# Patient Record
Sex: MALE
[De-identification: names, ages, dates, MRNs, and addresses within clinical notes are randomized per-mention and may not be internally consistent; named-entity substitution may affect disease eponyms.]

---

## 2018-05-14 ENCOUNTER — HOSPITAL ENCOUNTER (INPATIENT)
Dept: HOSPITAL 92 - ERS | Age: 53
LOS: 3 days | Discharge: HOME | DRG: 417 | End: 2018-05-17
Attending: FAMILY MEDICINE | Admitting: FAMILY MEDICINE
Payer: SELF-PAY

## 2018-05-14 VITALS — BODY MASS INDEX: 35 KG/M2

## 2018-05-14 DIAGNOSIS — K85.90: ICD-10-CM

## 2018-05-14 DIAGNOSIS — I10: ICD-10-CM

## 2018-05-14 DIAGNOSIS — K80.71: Primary | ICD-10-CM

## 2018-05-14 LAB
ALBUMIN SERPL BCG-MCNC: 4.3 G/DL (ref 3.5–5)
ALP SERPL-CCNC: 278 U/L (ref 40–150)
ALT SERPL W P-5'-P-CCNC: 368 U/L (ref 8–55)
ANION GAP SERPL CALC-SCNC: 13 MMOL/L (ref 10–20)
AST SERPL-CCNC: 131 U/L (ref 5–34)
BASOPHILS # BLD AUTO: 0 THOU/UL (ref 0–0.2)
BASOPHILS NFR BLD AUTO: 0.5 % (ref 0–1)
BILIRUB DIRECT SERPL-MCNC: 3.9 MG/DL (ref 0.1–0.3)
BILIRUB SERPL-MCNC: 5.4 MG/DL (ref 0.2–1.2)
BUN SERPL-MCNC: 20 MG/DL (ref 8.9–20.6)
CALCIUM SERPL-MCNC: 9.8 MG/DL (ref 7.8–10.44)
CHLORIDE SERPL-SCNC: 105 MMOL/L (ref 98–107)
CO2 SERPL-SCNC: 23 MMOL/L (ref 22–29)
CREAT CL PREDICTED SERPL C-G-VRATE: 0 ML/MIN (ref 70–130)
EOSINOPHIL # BLD AUTO: 0 THOU/UL (ref 0–0.7)
EOSINOPHIL NFR BLD AUTO: 0.1 % (ref 0–10)
GLOBULIN SER CALC-MCNC: 3.7 G/DL (ref 2.4–3.5)
GLUCOSE SERPL-MCNC: 161 MG/DL (ref 70–105)
HBCM INDEX: 0.08 S/CO (ref 0–0.79)
HBSAG INDEX: 0.17 S/CO (ref 0–0.99)
HEP A IGM S/CO: 0.14 S/CO (ref 0–0.79)
HEP C INDEX: 0.09 S/CO (ref 0–0.79)
HGB BLD-MCNC: 15.1 G/DL (ref 14–18)
LDH1 SERPL-CCNC: 282 U/L (ref 125–220)
LIPASE SERPL-CCNC: 7593 U/L (ref 8–78)
LYMPHOCYTES # BLD: 0.7 THOU/UL (ref 1.2–3.4)
LYMPHOCYTES NFR BLD AUTO: 10.3 % (ref 21–51)
MAGNESIUM SERPL-MCNC: 2 MG/DL (ref 1.6–2.6)
MCH RBC QN AUTO: 31.4 PG (ref 27–31)
MCV RBC AUTO: 92.4 FL (ref 80–94)
MONOCYTES # BLD AUTO: 0.1 THOU/UL (ref 0.11–0.59)
MONOCYTES NFR BLD AUTO: 1.8 % (ref 0–10)
NEUTROPHILS # BLD AUTO: 6.3 THOU/UL (ref 1.4–6.5)
NEUTROPHILS NFR BLD AUTO: 87.3 % (ref 42–75)
PLATELET # BLD AUTO: 218 THOU/UL (ref 130–400)
POTASSIUM SERPL-SCNC: 4.4 MMOL/L (ref 3.5–5.1)
RBC # BLD AUTO: 4.81 MILL/UL (ref 4.7–6.1)
SODIUM SERPL-SCNC: 137 MMOL/L (ref 136–145)
SP GR UR STRIP: 1.04 (ref 1–1.04)
WBC # BLD AUTO: 7.2 THOU/UL (ref 4.8–10.8)

## 2018-05-14 PROCEDURE — 83690 ASSAY OF LIPASE: CPT

## 2018-05-14 PROCEDURE — 85025 COMPLETE CBC W/AUTO DIFF WBC: CPT

## 2018-05-14 PROCEDURE — 83615 LACTATE (LD) (LDH) ENZYME: CPT

## 2018-05-14 PROCEDURE — 74330 X-RAY BILE/PANC ENDOSCOPY: CPT

## 2018-05-14 PROCEDURE — 36415 COLL VENOUS BLD VENIPUNCTURE: CPT

## 2018-05-14 PROCEDURE — 71046 X-RAY EXAM CHEST 2 VIEWS: CPT

## 2018-05-14 PROCEDURE — 76705 ECHO EXAM OF ABDOMEN: CPT

## 2018-05-14 PROCEDURE — 96365 THER/PROPH/DIAG IV INF INIT: CPT

## 2018-05-14 PROCEDURE — S0028 INJECTION, FAMOTIDINE, 20 MG: HCPCS

## 2018-05-14 PROCEDURE — 81003 URINALYSIS AUTO W/O SCOPE: CPT

## 2018-05-14 PROCEDURE — 74177 CT ABD & PELVIS W/CONTRAST: CPT

## 2018-05-14 PROCEDURE — 96361 HYDRATE IV INFUSION ADD-ON: CPT

## 2018-05-14 PROCEDURE — 80053 COMPREHEN METABOLIC PANEL: CPT

## 2018-05-14 PROCEDURE — 80074 ACUTE HEPATITIS PANEL: CPT

## 2018-05-14 PROCEDURE — 82977 ASSAY OF GGT: CPT

## 2018-05-14 PROCEDURE — 93005 ELECTROCARDIOGRAM TRACING: CPT

## 2018-05-14 PROCEDURE — 83735 ASSAY OF MAGNESIUM: CPT

## 2018-05-14 PROCEDURE — 96375 TX/PRO/DX INJ NEW DRUG ADDON: CPT

## 2018-05-14 PROCEDURE — 82248 BILIRUBIN DIRECT: CPT

## 2018-05-14 PROCEDURE — 80061 LIPID PANEL: CPT

## 2018-05-14 PROCEDURE — 86901 BLOOD TYPING SEROLOGIC RH(D): CPT

## 2018-05-14 PROCEDURE — 84100 ASSAY OF PHOSPHORUS: CPT

## 2018-05-14 PROCEDURE — 86301 IMMUNOASSAY TUMOR CA 19-9: CPT

## 2018-05-14 PROCEDURE — 86850 RBC ANTIBODY SCREEN: CPT

## 2018-05-14 PROCEDURE — 88304 TISSUE EXAM BY PATHOLOGIST: CPT

## 2018-05-14 PROCEDURE — 86900 BLOOD TYPING SEROLOGIC ABO: CPT

## 2018-05-14 PROCEDURE — 47532 INJECTION FOR CHOLANGIOGRAM: CPT

## 2018-05-14 NOTE — CT
CT ABDOMEN AND PELVIS WITH IV CONTRAST:

 

Date:  05/14/18 

 

HISTORY:  

Epigastric pain and jaundice. Vomiting. 

 

COMPARISON:  

None available. 

 

FINDINGS:

There is bibasilar atelectasis. 

 

There is intra and extrahepatic biliary ductal dilatation. The common duct measures 1.2 cm. There is 
dilatation of the common duct to the level of the duodenum with dilatation in the region of the ampul
la. Ampullary lesion is a possibility. 

 

The gallbladder is incompletely distended, but is lobulated in appearance and there is increased dens
ity material within the gallbladder lumen. This could be related to sludge and/or calculi, but gallbl
adder ultrasound is recommended. 

 

The spleen, pancreas, bilateral adrenal glands, kidneys, and urinary bladder demonstrate a normal CT 
appearance. 

 

There is colonic diverticulosis. There is mild nonspecific inflammatory stranding seen at the tail of
 the pancreas. Exact etiology for the minimal amount of fluid and peripancreatic stranding is uncerta
in. Pancreatitis cannot be entirely excluded, but there are no inflammatory changes seen adjacent to 
the remainder of the pancreas. 

 

There is mild prominence of loops of small bowel which are fluid-filled. 

 

Degenerative changes are noted in the spine. 

 

 

IMPRESSION: 

 

1.  Intra and extrahepatic biliary ductal dilatation with the common duct measuring 1.2 cm. There is 
dilatation of the common duct to the level of the ampulla. Ampullary lesion could not be excluded. 

 

2.  Increased density material in the gallbladder lumen, which could be related to sludge or gallblad
fitz calculi. The gallbladder is also lobulated and contracted. Further evaluation right upper quadran
t ultrasound is recommended. 

 

3.  Inflammatory stranding in the left paracolic gutter adjacent to the tail of the pancreas. Exact e
tiology for these inflammatory changes is uncertain. Focal pancreatitis could not be entirely exclude
d; there are no inflammatory changes seen adjacent to the remainder of the pancreas. 

 

4.  Colonic diverticulosis. 

 

5.  Fluid-filled and mildly prominent loops of small bowel. There are no findings to definitely sugge
st obstruction. 

 

 

 

POS: [facility]

## 2018-05-14 NOTE — PDOC.FPRHP
- History of Present Illness


Chief Complaint: Abdominal pain 


History of Present Illness: 





51 yo male presents with 1 day history of nausea, vomiting and abdominal pain. 

He states that it began last night around 7:00 pm and has progressively gotten 

worse. He states that he has not had any recent illness. He states he had one 

drink of liquor on Saturday. He denies having an excess of fatty foods and 

actually states he has been on a diet. He also states that he has not had SOB, 

chest pain, fevers, or chills. He has not had this happen before. He states the 

morphine and the zofran have been very helpful. No other complaints today.


ED Course: 





Morphine


Zofran


1 L NS


1 L LR





- Allergies/Adverse Reactions


 Allergies











Allergy/AdvReac Type Severity Reaction Status Date / Time


 


No Known Drug Allergies Allergy   Verified 18 15:08














- Home Medications


Comments: 





unable to obtain at this time, family will bring medications





- History


PMHx: Hypertension, Elevated Liver enzymes


 


PSHx: None





FHx: noncontributory


 


Social: drinks alcohol social, no drugs, no smoking





- Review of Systems


General: denies: fever/chills


Eyes: denies: eye pain


ENT: denies: nasal congestion, rhinorrhea


Respiratory: denies: cough, congestion, shortness of breath


Cardiovascular: denies: chest pain, palpitation, edema


Gastrointestinal: reports: nausea, vomiting, abdominal pain


Genitourinary: denies: incontinence, dysuria


Skin: reports: jaundice.  denies: rashes, lesions


Musculoskeletal: denies: pain, tenderness, stiffness


Neurological: denies: numbness, syncope


Psychological: denies: anxiety, depression





- Vital signs


BP: 155/98  HR: 71 RR: 16 Tmax:  Pox: 97% on RmAir  Wt: 86 kg   








- Physical Exam


Constitutional: NAD, awake, alert and oriented, well developed


HEENT: normocephalic and atraumatic, PERRLA, EOMI, conjunctiva clear, grossly 

normal vision, TM's clear and intact, grossly normal hearing, normal nasal 

mucosa, MMM, oropharynx clear


-HEENT: 





icteric sclera, sublingual jaundice


Neck: supple, FROM, trachea midline, no LAD


Chest: no-tender to palpation, no lesions


Heart: RRR, normal S1/S2, no murmurs/rubs/gallops


Lungs: CTAB, no respiratory distress, good air movement, no rales/rhonchi, no 

wheezing


Abdomen: soft, bowel sounds present, no masses/distention, no hernias


-Abdomen: 





RUQ and epigastric TTP, no rebound, no guarding


Musculoskeletal: normal structure, normal tone, ROM grossly normal


Neurological: no focal deficit, CN II-XII intact, normal sensation


Skin: no rash/lesions, good turgor, capillary refill <2 seconds


Heme/Lymphatic: no unusual bruising or bleeding, no purpura, no petechia


Psychiatric: normal mood and affect, good judgment and insight, intact recent 

and remote memory





FMR H&P: Results





- Labs


Result Diagrams: 


 18 12:05





 18 12:05


Lab results: 


 











WBC  7.2 thou/uL (4.8-10.8)   18  12:05    


 


Hgb  15.1 g/dL (14.0-18.0)   18  12:05    


 


Hct  44.5 % (42.0-52.0)   18  12:05    


 


MCV  92.4 fl (80.0-94.0)   18  12:05    


 


Plt Count  218 thou/uL (130-400)   18  12:05    


 


Neutrophils %  87.3 % (42.0-75.0)  H  18  12:05    


 


Sodium  137 mmol/L (136-145)   18  12:05    


 


Potassium  4.4 mmol/L (3.5-5.1)   18  12:05    


 


Chloride  105 mmol/L ()   18  12:05    


 


Carbon Dioxide  23 mmol/L (22-29)   18  12:05    


 


BUN  20 mg/dL (8.9-20.6)   18  12:05    


 


Creatinine  0.97 mg/dL (0.6-1.3)   18  12:05    


 


Glucose  161 mg/dL ()  H  18  12:05    


 


Calcium  9.8 mg/dL (7.8-10.44)   18  12:05    


 


Total Bilirubin  5.4 mg/dL (0.2-1.2)  H  18  12:05    


 


AST  131 U/L (5-34)  H  18  12:05    


 


ALT  368 U/L (8-55)  H  18  12:05    


 


Alkaline Phosphatase  278 U/L ()  H  18  12:05    


 


Serum Total Protein  8.0 g/dL (6.0-8.3)   18  12:05    


 


Albumin  4.3 g/dL (3.5-5.0)   18  12:05    


 


Lipase  7593 U/L (8-78)  H  18  12:05    














- EKG Interpretation


EK lead EKG shows normal sinus rhythm, Rate (beats per minute): 62, with no 

ectopics, Interpretation: normal EKG, No other findings, Clinical impression: 

Normal EKG.





- Radiology Interpretation


  ** CT scan - abdomen


Status: report reviewed by me (Intra and extrahepatic biliary ductal dilatation 

with the common duct measuring 1.2cm. There is dilatation of the common duct to 

the level of the ampulla. Ampullary lesion could not be excluded. Increased 

density material in the gallbladder lumen, which could. be related to sludge or 

gallbladder calculi. The gallbladder is also lobulated and contracted. Further 

evaluation right upper quadrant is recommended. Inflammatory stranding in the 

left paracolic gutter adjacent to the tail of the pancreas. Exact etiology for 

these inflammatory changes in uncertain. Focal pancreatitis could not be 

entirely excluded, there are no inflammatory changes seen adjacent to the 

remainder of the pancreas. Fluid filled cyst and mildly prominent loops of 

small bowel. No definite findings of obstruction.)





FMR H&P: A/P





- Problem List


(1) Gallstone pancreatitis


Current Visit: Yes   Status: Acute   Code(s): K85.10 - BILIARY ACUTE 

PANCREATITIS WITHOUT NECROSIS OR INFECTION   





(2) HTN (hypertension)


Current Visit: Yes   Status: Acute   Code(s): I10 - ESSENTIAL (PRIMARY) 

HYPERTENSION   





(3) Elevated liver enzymes


Current Visit: Yes   Status: Acute   Code(s): R74.8 - ABNORMAL LEVELS OF OTHER 

SERUM ENZYMES   





- Plan





1. Gallstone pancreatitis


- Lipase 7593 on admission, elevated Alk Phos, AST/ALT


- GI consult pending


- General surgery recommended GI consult


- Pain control


- NPO


- IVF


- BISAP score 0


- [place]'s criteria score pending per lab


- Recheck Labs in AM





2. Hypertension


- Med reconciliation and resume home meds when available


- Continue to monitor 





3. Elevated Liver Enzymes


- Past history, Hepatitis panel pending


- Acute process causing elevation at this time.





CODE STATUS: FULL





Disposition: Stable, will admit to medical. 





FMR H&P: Upper Level





- Pertinent history


51 yo M with PMH of HTN presenting with 1 day of abdominal pain.  States pain 

started yesterday afternoon.  States he has had some N/V, nonbloody.  Appetite 

decreased as well.  No fevers/chills, diarrhea.  Abdominal pain is epigastric 

in nature, nonradiating.  Worse with deep inspiration.  No history of 

pancreatitis in the past.  Endorses some alcohol use, but not in excess.





- Pertinent findings





PE:


T: 98.1 P: 71 BP: 155/98 RR: 16  97%


Gen: [place] male in NAD


HEENT: PERRL, EOMI, MMM, no lymphadenopathy or thyromegaly


CV: RRR no murmurs, distal pulses intact


Pulm: CTAB, no wheezes or rhonchi


Abd: soft, TTP in epigastrium and RLQ, BS present, no masses or distention


Ext: no cyanosis or edema


MSK: MERCADO well, no joint or muscle pain or swelling


Neuro: CN 2-12 intact, normal sensation


Psych: A&O x3, appropriate in conversation





- Plan


Date/Time: 18 1507


51 yo M here with abdominal pain.


1) Gallstone pancreatitis: Admit to medical.  Continue IVFs, NPO, pain control 

as needed. Trend enzymes, CMP and CBC.  [place] score = 0. 


2) HTN: Unsure what medication he is taking.





I, [Antwan Stephenson], have evaluated this patient and agree with findings/plan as 

outlined by intern resident. Pertinent changes/additions are listed here.








Attending Addendum





- Attending Addendum


Date/Time: 18 2745





I personally evaluated the patient and discussed the management with team.


I agree with and repeated the History, Examination, Assessment and Plan 

documented above with any addition or exceptions noted below.





GS pancreatitis with caveats noted in CT report.  GI and GS consulted. IVF, 

pain control, NPO.  Trend LFTs.  DVT/GI ppx.

## 2018-05-14 NOTE — RAD
CHEST TWO VIEWS:

 

HISTORY:

Epigastric pain.  Evaluate for effusion.  Chest pain.

 

COMPARISON:

None.

 

FINDINGS:

Heart size is mildly enlarged.  No focal air space consolidation, pneumothorax, or effusion.  No acut
e osseous abnormality.

 

IMPRESSION:

1.  Mild cardiomegaly.

2.  Atelectasis left lung base.

 

POS: [facility]

## 2018-05-14 NOTE — ULT
ULTRASOUND GALLBLADDER RIGHT UPPER QUADRANT:

 

HISTORY:

Appendicitis.  Right upper quadrant pain.

 

COMPARISON:

CT from the same day.

 

FINDINGS:

The hepatic echotexture is coarsened and heterogeneous.  The visualized portion of the pancreatic hea
d is unremarkable.

 

Severe intrahepatic biliary dilatation.  The portal vein is patent with antegrade flow.

 

The common bile duct measures up to 1.2 cm.  The distal common bile duct measures up to 1.5 cm.

 

There are stones and sludge within the gallbladder.

 

The right kidney measures 11 x 4.4 x 6.2 cm, without mass, hydronephrosis, or abnormal calcification

 

IMPRESSION:

1.  Coarse and heterogeneous appearance of the liver may be sequela of cirrhosis or steatosis.

2.  Apparent sludge and stones in the gallbladder. Multiphase MRI may be beneficial with MRCP.

 

POS: [facility]

## 2018-05-15 LAB
ALBUMIN SERPL BCG-MCNC: 3.7 G/DL (ref 3.5–5)
ALP SERPL-CCNC: 255 U/L (ref 40–150)
ALT SERPL W P-5'-P-CCNC: 308 U/L (ref 8–55)
ANION GAP SERPL CALC-SCNC: 13 MMOL/L (ref 10–20)
AST SERPL-CCNC: 121 U/L (ref 5–34)
BASOPHILS # BLD AUTO: 0 THOU/UL (ref 0–0.2)
BASOPHILS NFR BLD AUTO: 0 % (ref 0–1)
BILIRUB SERPL-MCNC: 8.8 MG/DL (ref 0.2–1.2)
BUN SERPL-MCNC: 14 MG/DL (ref 8.9–20.6)
CALCIUM SERPL-MCNC: 8.9 MG/DL (ref 7.8–10.44)
CHD RISK SERPL-RTO: 4.1 (ref ?–4.5)
CHLORIDE SERPL-SCNC: 107 MMOL/L (ref 98–107)
CHOLEST SERPL-MCNC: 140 MG/DL
CO2 SERPL-SCNC: 20 MMOL/L (ref 22–29)
CREAT CL PREDICTED SERPL C-G-VRATE: 143 ML/MIN (ref 70–130)
EOSINOPHIL # BLD AUTO: 0 THOU/UL (ref 0–0.7)
EOSINOPHIL NFR BLD AUTO: 0.3 % (ref 0–10)
GLOBULIN SER CALC-MCNC: 3.3 G/DL (ref 2.4–3.5)
GLUCOSE SERPL-MCNC: 89 MG/DL (ref 70–105)
HDLC SERPL-MCNC: 34 MG/DL
HGB BLD-MCNC: 14 G/DL (ref 14–18)
LDLC SERPL CALC-MCNC: 89 MG/DL
LYMPHOCYTES # BLD: 1.8 THOU/UL (ref 1.2–3.4)
LYMPHOCYTES NFR BLD AUTO: 22 % (ref 21–51)
MCH RBC QN AUTO: 31.6 PG (ref 27–31)
MCV RBC AUTO: 91.6 FL (ref 80–94)
MONOCYTES # BLD AUTO: 0.6 THOU/UL (ref 0.11–0.59)
MONOCYTES NFR BLD AUTO: 7.7 % (ref 0–10)
NEUTROPHILS # BLD AUTO: 5.7 THOU/UL (ref 1.4–6.5)
NEUTROPHILS NFR BLD AUTO: 70 % (ref 42–75)
PLATELET # BLD AUTO: 219 THOU/UL (ref 130–400)
POTASSIUM SERPL-SCNC: 3.5 MMOL/L (ref 3.5–5.1)
RBC # BLD AUTO: 4.44 MILL/UL (ref 4.7–6.1)
SODIUM SERPL-SCNC: 136 MMOL/L (ref 136–145)
TRIGL SERPL-MCNC: 87 MG/DL (ref ?–150)
WBC # BLD AUTO: 8.2 THOU/UL (ref 4.8–10.8)

## 2018-05-15 NOTE — CON
DATE OF CONSULTATION:  05/14/2018

 

REFERRING DOCTOR:  Dr. Antonio Negron, [facility].

 

REASON FOR CONSULTATION:  Abdominal pain, nausea, abnormal liver function tests, and acute pancreatit
is.

 

HISTORY OF PRESENT ILLNESS:  Mr. Slim Fagan is a 52-year-old Latin-American male admitted with ab
dominal pain with nausea.  The pain started on the evening of 05/13/2018.  The pain was localized ove
r the epigastric area.  The pain was intense.  He had nausea, but no vomiting.  No fever or chills.  
No similar episodes in the past.  The patient did drink some alcohol over the weekend.  Prior to 5 mo
nths ago, he has been drinking alcohol heavily.  He was drinking as much as 2-6 packs per day and at 
times on the weekend, he is drinking a lot more than that.  He quit drinking alcohol heavily, about 6
 or 5 months ago.  Now, he has been drinking socially.  There is no history of drug abuse.  No histor
y of smoking.  The patient came to the ER because of abdominal pain, nausea, and was found to have ac
[place] pancreatitis.  Serum lipase was more than 7900.  He was also found to have elevated LFTs and had 
an abdominal CAT scan and sonogram.  The CAT scan shows biliary sludge and also dilation of the CBD t
o almost 1.2 cm.  The dilation extended all the way up to the papilla.  His abdominal sonogram later 
on which revealed biliary sludge and gallstones.  Since admission, his symptoms markedly improved.  H
e has no more abdominal pain.  He feels floated and full.  He is not passing any flatus.  No nausea. 
 He has no family history of any gallstones.  He has no other relevant symptoms.

 

ALLERGIES:  None.

 

SOCIAL HISTORY:  The patient does not smoke, but does drink alcohol rarely until 5 months ago.  Now, 
he drinks socially off and on.  No history of drug use.

 

MEDICAL ILLNESSES:  Hypertension.  No other medical illness.

 

SURGERIES:  None.

 

FAMILY HISTORY:  No family history of any gallstones, any cancer, CVA, heart disease.

 

REVIEW OF SYSTEMS:  Ten-point system reviewed.  CNS:  No headache, no syncope, no TIA, no seizure dis
order.  Respiratory:  No history of chronic cough, hemoptysis, dyspnea.  Cardiovascular system:  No c
hest pain, no palpitation, no exertional dyspnea, orthopnea, or PND.  Gastrointestinal:  As stated in
 history of present illness.  Genitourinary:  No dysuria, hematuria, or frequent urination.  Musculos
keletal, Endocrine, Hematological, Neuro, Psychiatry:  Not relevant.

 

PHYSICAL EXAMINATION:

GENERAL:  The patient appears very comfortable, he is in no distress.

VITAL SIGNS:  His vital signs are actually very stable.  He is afebrile.  Pulse is 62, blood pressure
 168/88.

HEENT:  Conjunctivae icteric.

NECK:  Supple.  No adenitis or thyromegaly noted.

CARDIOVASCULAR SYSTEM:  First and second heart sounds normal.

LUNGS:  Clear to auscultation.

ABDOMEN:  Soft and mildly distended.  Abdomen is actually nontender.  There is no organomegaly or mas
ses.  Bowel sounds are normal.

EXTREMITIES:  Reveal no edema.

 

LABORATORY DATA:  CBC shows WBC 7200, hemoglobin 15.1, hematocrit 44.5, polymorphs 87, and lymphocyte
s 10.  Serum chemistries:  Electrolytes are normal.  BUN is 20, creatinine 0.97, glucose 161, calcium
 9.8.  Bilirubin is 5.4, direct bilirubin 3.9.  GGT 1337, , , alkaline phosphatase 278.
  Lipase is 7593, .  Abdominal sonogram shows gallstones and biliary sludge and coarse liver p
arenchyma possibly representing underlying liver cirrhosis and fatty liver.  The common duct is dilat
ed to 1.2 cm.

 

CLINICAL IMPRESSION:

1.  Abdominal pain, nausea, and evidence of acute pancreatitis.  He has most likely biliary pancreati
tis.  However, he has positive alcohol abuse for many years.  He quit drinking alcohol on a regular b
asis 5 months ago.  He still drinks socially.

2.  Abnormal liver function tests, most likely he has retained common bile duct stone.

 

RECOMMENDATIONS:

1.  Repeat serum lipase tomorrow.

2.  We will plan for ERCP, hopefully tomorrow as his _____ completely resolved.

3.  Discussed procedure in detail.  The patient agreed.  I will plan for ERCP tomorrow.

## 2018-05-15 NOTE — PDOC.FM
- Subjective


Subjective: 





Slim Fagan seen at bedside this morning.  He did well overnight, there were 

no acute events.  He states that is abdominal pain is well controlled.  He is 

scheduled for an ERCP today.  





- Objective


MAR Reviewed: Yes


Vital Signs & Weight: 


 Vital Signs (12 hours)











  Temp Pulse Resp BP Pulse Ox


 


 05/15/18 05:10  97.9 F  59 L  20  136/80  96


 


 05/15/18 00:00  97.9 F  57 L  20  135/78  96


 


 05/14/18 23:00  97.4 F L  54 L  20  








 Weight











Weight                         81.335 kg














I&O: 


 











 05/14/18 05/15/18 05/16/18





 06:59 06:59 06:59


 


Intake Total  2200 


 


Balance  2200 











Result Diagrams: 


 05/15/18 03:37





 05/15/18 03:37





Phys Exam





- Physical Examination


Constitutional: NAD


HEENT: moist MMs


mild scleral icterus 


Neck: no JVD, supple, full ROM


Respiratory: no wheezing, no rales, no rhonchi, clear to auscultation bilateral


Cardiovascular: RRR, no significant murmur


Gastrointestinal: soft, non-tender, no distention, positive bowel sounds


Musculoskeletal: no edema, pulses present


Neurological: non-focal, normal sensation, moves all 4 limbs


Psychiatric: normal affect, A&O x 3


Skin: no rash, normal turgor





Dx/Plan


(1) Gallstone pancreatitis


Code(s): K85.10 - BILIARY ACUTE PANCREATITIS WITHOUT NECROSIS OR INFECTION   

Status: Acute   





- Plan


Plan: 





1. Gallstone pancreatitis


- Lipase 7593 on admission, elevated Alk Phos, AST/ALT


- GI consulted, appreciate recs


- General surgery consulted, appreciate recs


- Continue pain control


- NPO


- IVF


- ERCP scheduled for today





2. Hypertension


- Med reconciliation and resume home meds when available


- Continue to monitor 





3. Elevated Liver Enzymes


- Past history, Hepatitis panel pending


- Acute process causing elevation at this time.

## 2018-05-15 NOTE — ADD-PRG
DATE OF SERVICE:  05/15/2018

 

This is an addendum to the note of Dr. Domingo Ragland.

 

HISTORY OF PRESENT ILLNESS:  Mr. Fagan is a pleasant 52-year-old  male who was admitted wi
th acute pancreatitis.  He had a lipase level in excess of 7000 as well as an elevated white count.  
CT of the abdomen does not demonstrate any complications of his acute pancreatitis.  He does, however
, have an obstructed common duct and has been evaluated by both GI and Surgery.  GI will perform ERCP
 later today and Dr. Naylor will thereafter take the patient for cholecystectomy, likely tomorrow or
 the next day.  Clinically, Mr. Fagan is much improved from admission.

 

We will continue to follow with the GI and Surgery Services.  Incidentally, Mr. Fagan on one of hi
s imaging studies was noticed to have mild cardiomegaly.  We can continue to follow this and work it 
up as an outpatient with at least echocardiogram and BNP later.

## 2018-05-15 NOTE — CON
DATE OF CONSULTATION:  05/14/2018

 

HISTORY OF PRESENT ILLNESS:  This is a 52-year-old male who presents with a history of nausea, vomiti
ng, abdominal pain, mostly midepigastric since last night.  He has previously been a pretty heavy dri
nker, had one drink of alcohol on Saturday, but the pain did not immediately start after that.  No as
sociated chest pain, fever, chills, night sweats.  No change in stools.  No history of chronic pancre
atitis or cirrhosis.  Liver function tests are elevated including his bilirubin as well as his lipase
.  He has been hemodynamically stable.

 

PAST MEDICAL HISTORY:  Hypertension, former heavy alcohol.

 

PAST SURGICAL HISTORY:  Denies.

 

SOCIAL HISTORY:  Previous heavy drinker, not recently.  No drugs, smoking.

 

MEDICINES:  See list.

 

ALLERGIES:  No known drug allergies.

 

FAMILY HISTORY:  Noncontributory, GI malignancy, or anesthetic related complication.

 

REVIEW OF SYSTEMS:  Ten-system review of systems otherwise negative unless described above.

 

PHYSICAL EXAMINATION:

VITAL SIGNS:  Blood pressure is 160/88, pulse 62, respirations 18.  He is afebrile.

HEENT:  Sclerae are anicteric.  Oropharynx clear.

NECK:  No lymphadenopathy.

CHEST:  Clear.

HEART:  Regular rhythm.

ABDOMEN:  Soft, tender epigastric with localized guarding, without rebound, no abdominal or inguinal 
hernias.

EXTREMITIES:  No ischemia or edema to extremities.

 

LABORATORY DATA:  White blood cell count is 7, hemoglobin 15, platelet count is 218.  Sodium 137, pot
assium 4.4, creatinine is 0.97.  Bilirubin 5.4.  AST, ALT are elevated.  Lipase is 7593.  Ultrasound 
shows likely sludge and stones.  Common bile duct 1.2 cm.

 

ASSESSMENT:  Gallstone pancreatitis.

 

PLAN:  Laparoscopic cholecystectomy with intraoperative cholangiogram, likely on Wednesday.  If his b
ilirubin is not trending down and may need ERCP first.

## 2018-05-15 NOTE — PRG
DATE OF SERVICE:  05/15/2018

 

SUBJECTIVE:  Mr. Fagan feels better.  He has no significant complaints.  No nausea.  He has been h
emodynamically stable.

 

OBJECTIVE:

VITAL SIGNS:  Afebrile.  Vital signs are stable.

CHEST:  Clear.

HEART:  Regular rate and rhythm.

ABDOMEN:  Soft, minimally tender in the epigastric area.

 

Bilirubin is up to 8.  Lipase is still elevated.

 

ASSESSMENT:  Gallstone pancreatitis with continued elevation of bilirubin.

 

PLAN:  Dr. Wilson is to see.  The plan is an ERCP within the next few days.  I will perform laparo
scopic cholecystectomy at a later date during this hospitalization.

## 2018-05-16 LAB
ALBUMIN SERPL BCG-MCNC: 3.5 G/DL (ref 3.5–5)
ALP SERPL-CCNC: 240 U/L (ref 40–150)
ALT SERPL W P-5'-P-CCNC: 215 U/L (ref 8–55)
ANION GAP SERPL CALC-SCNC: 9 MMOL/L (ref 10–20)
AST SERPL-CCNC: 66 U/L (ref 5–34)
BASOPHILS # BLD AUTO: 0 THOU/UL (ref 0–0.2)
BASOPHILS NFR BLD AUTO: 0.4 % (ref 0–1)
BILIRUB SERPL-MCNC: 3.6 MG/DL (ref 0.2–1.2)
BUN SERPL-MCNC: 11 MG/DL (ref 8.9–20.6)
CALCIUM SERPL-MCNC: 8.5 MG/DL (ref 7.8–10.44)
CHLORIDE SERPL-SCNC: 107 MMOL/L (ref 98–107)
CO2 SERPL-SCNC: 21 MMOL/L (ref 22–29)
CREAT CL PREDICTED SERPL C-G-VRATE: 139 ML/MIN (ref 70–130)
EOSINOPHIL # BLD AUTO: 0.1 THOU/UL (ref 0–0.7)
EOSINOPHIL NFR BLD AUTO: 0.9 % (ref 0–10)
GLOBULIN SER CALC-MCNC: 3.1 G/DL (ref 2.4–3.5)
GLUCOSE SERPL-MCNC: 86 MG/DL (ref 70–105)
HGB BLD-MCNC: 13.9 G/DL (ref 14–18)
LYMPHOCYTES # BLD: 2 THOU/UL (ref 1.2–3.4)
LYMPHOCYTES NFR BLD AUTO: 23.7 % (ref 21–51)
MCH RBC QN AUTO: 31.7 PG (ref 27–31)
MCV RBC AUTO: 92.4 FL (ref 80–94)
MONOCYTES # BLD AUTO: 0.7 THOU/UL (ref 0.11–0.59)
MONOCYTES NFR BLD AUTO: 8.6 % (ref 0–10)
NEUTROPHILS # BLD AUTO: 5.7 THOU/UL (ref 1.4–6.5)
NEUTROPHILS NFR BLD AUTO: 66.4 % (ref 42–75)
PLATELET # BLD AUTO: 200 THOU/UL (ref 130–400)
POTASSIUM SERPL-SCNC: 3.3 MMOL/L (ref 3.5–5.1)
RBC # BLD AUTO: 4.38 MILL/UL (ref 4.7–6.1)
SODIUM SERPL-SCNC: 134 MMOL/L (ref 136–145)
WBC # BLD AUTO: 8.6 THOU/UL (ref 4.8–10.8)

## 2018-05-16 PROCEDURE — 0FJB8ZZ INSPECTION OF HEPATOBILIARY DUCT, VIA NATURAL OR ARTIFICIAL OPENING ENDOSCOPIC: ICD-10-PCS | Performed by: INTERNAL MEDICINE

## 2018-05-16 NOTE — RAD
ERCP

 

Two intraoperative images obtained. 

 

FINDINGS: 

Images demonstrate an endoscope in place. There appears to catheterization and injection of the pancr
eatic duct. The biliary system is not visualized. 

 

IMPRESSION: 

Incomplete ERCP. There appears to be catheterization of the pancreatic duct. No other significant abn
ormalities seen. 

 

POS: [facility]

## 2018-05-16 NOTE — PDOC.FM
- Subjective


Subjective: 





Rylan Smalls seen at bedside this morning.  Doing well. Scheduled for ERCP 

this morning.  No questions or concerns. No acute events overnight. 





- Objective


MAR Reviewed: Yes


Vital Signs & Weight: 


 Vital Signs (12 hours)











  Temp Pulse Resp BP Pulse Ox


 


 05/16/18 05:40  98.7 F  72  18  131/73  97


 


 05/15/18 22:00  98.6 F  68  20  








 Weight











Weight                         81.335 kg














I&O: 


 











 05/15/18 05/16/18 05/17/18





 06:59 06:59 06:59


 


Intake Total 2200 2100 


 


Balance 2200 2100 











Result Diagrams: 


 05/16/18 03:33





 05/16/18 03:33





Phys Exam





- Physical Examination


Constitutional: NAD


HEENT: moist MMs, sclera anicteric


Neck: no JVD, supple, full ROM


Respiratory: no wheezing, no rales, no rhonchi, clear to auscultation bilateral


Cardiovascular: RRR, no significant murmur


Gastrointestinal: soft, non-tender, no distention


Musculoskeletal: no edema, pulses present


Neurological: non-focal, normal sensation, moves all 4 limbs


Psychiatric: normal affect, A&O x 3


Skin: no rash, normal turgor





Dx/Plan


(1) Gallstone pancreatitis


Code(s): K85.10 - BILIARY ACUTE PANCREATITIS WITHOUT NECROSIS OR INFECTION   

Status: Acute   





- Plan


Plan: 





1. Gallstone pancreatitis


- Lipase 7593 on admission, elevated Alk Phos, AST/ALT


- Lipase down to 380 today


- GI consulted, appreciate recs


- General surgery consulted, appreciate recs


- Continue pain control


- NPO


- IVF


- taken down for ERCP this morning





2. Hypertension


- Med reconciliation and resume home meds when available


- Continue to monitor 





3. Elevated Liver Enzymes


- Past history, Hepatitis panel negative, will need vaccines prior to discharge


- Acute process causing elevation at this time.

## 2018-05-16 NOTE — ADD-PRG
ADDENDUM

 

DATE OF SERVICE:  05/16/2018

 

Please add this as an addendum to the note of Dr. Domingo Ragland.  Mr. Fagan just had an ERCP.  The 
impression of an ERCP was that it was an incomplete ERCP.  There appears to be catheterization of the
 pancreatic duct.  No other significant abnormalities are seen.  His lipase has dropped down to just 
above 300.  He is also being followed by Surgery and likely have a cholecystectomy tomorrow or Friday
.

## 2018-05-16 NOTE — PRG
DATE OF SERVICE:  05/15/2018

 

SUBJECTIVE:  This is a 52-year-old Latin-American male hospitalized with abdominal pain, nausea, and 
was found to have gallstone pancreatitis.  His CBD is dilated.  He also had elevated  LFTs, has histo
ry of choledocholithiasis.  His symptoms markedly improved.  His abdominal pain resolved.  He has no 
nausea or vomiting.  He is passing flatus.  His lipase level has come to around more than 3000.  Angelica
use of above reason, he has _____ today.  The patient is tolerating diet.  He offers no complaints.

 

PHYSICAL EXAMINATION:

GENERAL:  Appears comfortable.

VITAL SIGNS:  Afebrile.  Pulse is 68, blood pressure 148/85.

HEENT:  He is icteric.

CARDIOVASCULAR:  First and second heart sounds normal.

LUNGS:  Clear to auscultation.

ABDOMEN:  Soft to palpate.  No organomegaly.  No tenderness.  No masses.

 

LABORATORY DATA:  CBC is normal.  The chemistry panel shows lipase coming to 3470.  AST is 121, ALT 1
308, alkaline phosphatase 255.

 

CLINICAL IMPRESSION:

1.  Acute pancreatitis, most likely biliary.

2.  Abnormal LFTs, possibility of retained common bile duct stone.

 

PLAN:

1.  Clear liquid diet today.

2.  N.p.o. for midnight.

3.  ERCP tomorrow.

 

Explained ERCP in detail including benefits and risks.  He has agreed.

## 2018-05-17 VITALS — SYSTOLIC BLOOD PRESSURE: 133 MMHG | TEMPERATURE: 97.8 F | DIASTOLIC BLOOD PRESSURE: 73 MMHG

## 2018-05-17 LAB
ALBUMIN SERPL BCG-MCNC: 3.2 G/DL (ref 3.5–5)
ALBUMIN SERPL BCG-MCNC: 3.2 G/DL (ref 3.5–5)
ALP SERPL-CCNC: 187 U/L (ref 40–150)
ALP SERPL-CCNC: 188 U/L (ref 40–150)
ALT SERPL W P-5'-P-CCNC: 185 U/L (ref 8–55)
ALT SERPL W P-5'-P-CCNC: 186 U/L (ref 8–55)
ANION GAP SERPL CALC-SCNC: 10 MMOL/L (ref 10–20)
AST SERPL-CCNC: 70 U/L (ref 5–34)
AST SERPL-CCNC: 70 U/L (ref 5–34)
BASOPHILS # BLD AUTO: 0 THOU/UL (ref 0–0.2)
BASOPHILS NFR BLD AUTO: 0.4 % (ref 0–1)
BILIRUB DIRECT SERPL-MCNC: 0.9 MG/DL (ref 0.1–0.3)
BILIRUB SERPL-MCNC: 1.7 MG/DL (ref 0.2–1.2)
BILIRUB SERPL-MCNC: 1.8 MG/DL (ref 0.2–1.2)
BUN SERPL-MCNC: 10 MG/DL (ref 8.9–20.6)
CALCIUM SERPL-MCNC: 8.3 MG/DL (ref 7.8–10.44)
CHLORIDE SERPL-SCNC: 110 MMOL/L (ref 98–107)
CO2 SERPL-SCNC: 22 MMOL/L (ref 22–29)
CREAT CL PREDICTED SERPL C-G-VRATE: 141 ML/MIN (ref 70–130)
EOSINOPHIL # BLD AUTO: 0 THOU/UL (ref 0–0.7)
EOSINOPHIL NFR BLD AUTO: 0.3 % (ref 0–10)
GLOBULIN SER CALC-MCNC: 3.2 G/DL (ref 2.4–3.5)
GLUCOSE SERPL-MCNC: 109 MG/DL (ref 70–105)
HGB BLD-MCNC: 12.5 G/DL (ref 14–18)
LIPASE SERPL-CCNC: 145 U/L (ref 8–78)
LYMPHOCYTES # BLD: 2.3 THOU/UL (ref 1.2–3.4)
LYMPHOCYTES NFR BLD AUTO: 24.1 % (ref 21–51)
MCH RBC QN AUTO: 32.3 PG (ref 27–31)
MCV RBC AUTO: 93.6 FL (ref 80–94)
MONOCYTES # BLD AUTO: 0.7 THOU/UL (ref 0.11–0.59)
MONOCYTES NFR BLD AUTO: 7.3 % (ref 0–10)
NEUTROPHILS # BLD AUTO: 6.5 THOU/UL (ref 1.4–6.5)
NEUTROPHILS NFR BLD AUTO: 67.9 % (ref 42–75)
PLATELET # BLD AUTO: 201 THOU/UL (ref 130–400)
POTASSIUM SERPL-SCNC: 3.3 MMOL/L (ref 3.5–5.1)
RBC # BLD AUTO: 3.87 MILL/UL (ref 4.7–6.1)
SODIUM SERPL-SCNC: 139 MMOL/L (ref 136–145)
WBC # BLD AUTO: 9.5 THOU/UL (ref 4.8–10.8)

## 2018-05-17 PROCEDURE — BF101ZZ FLUOROSCOPY OF BILE DUCTS USING LOW OSMOLAR CONTRAST: ICD-10-PCS | Performed by: SURGERY

## 2018-05-17 PROCEDURE — 0FT44ZZ RESECTION OF GALLBLADDER, PERCUTANEOUS ENDOSCOPIC APPROACH: ICD-10-PCS | Performed by: SURGERY

## 2018-05-17 NOTE — RAD
INTRAOPERATIVE CHOLANGIOGRAM:

 

TECHNIQUE:

Four intraoperative cholangiogram images obtained.

 

Four C-arm images obtained, in the operating room, of the right upper quadrant of the abdomen.

 

FINDINGS:

There is catheterization injection of the cystic duct.  The common bile duct and common hepatic duct 
are dilated.  There appears to be an area of significant caliber change in the distal common bile kourtney
t.  The common bile duct is dilated; however, contrast does pass into the small bowel, suggesting duane
e passage through the small bowel.  I cannot exclude the possibility of a distal common bile duct mas
s.

 

IMPRESSION:

Abnormal dilatation of the common bile duct and common hepatic ducts with significant caliber change 
in the distal aspect of the common bile duct, at the level of the sphincter.

 

POS: SUSAN

## 2018-05-17 NOTE — ADD-PRG
ADDENDUM

 

DATE OF SERVICE:  05/17/2018

 

Mr. Fagan is resting quietly.  He is being taken for cholecystectomy this morning.  We will contin
ue to follow with the Surgery Service.  His liver parameters were much improved from admission, as is
 his lipase.

## 2018-05-17 NOTE — OP
DATE OF SURGERY:  05/16/2018

 

SURGEON:  Dominick Wilson M.D.

 

OPERATIVE PROCEDURE:  Endoscopic retrograde cholangiopancreatography.

 

PREOPERATIVE DIAGNOSES:  Gallstone pancreatitis, abnormal LFTs and possibility of common bile duct st
one.

 

POSTOPERATIVE DIAGNOSES:

1.  Unable to cannulate the CBD.

2.  Normal pancreatic duct.

 

PROCEDURE IN DETAIL:  The patient was intubated and was given sedation by Anesthesia Department.  A b
ite block was placed.  The patient was transferred to the fluoroscopy table.  The patient was turned 
on his stomach.  A Pentax video duodenoscope under direct vision passed down the oropharynx, past the
 GE junction into stomach and subsequently descending duodenum.  Initially, I could see what appeared
 to be papilla, but attempt to cannulate was unsuccessful.  It was realized that papilla were probabl
y in a different location.  The papilla was identified and very difficult to get the papilla in good 
position.

 

The short route of papilla could never be brought in the right location.  The long route was used to 
cannulate the papilla, but upon injection only the _____ injected.  Subsequently, several attempts to
 maneuver to get underneath the papilla but were unsuccessful.  There was good biliary drainage noted
.  The large amount of bile came out during the ERCP and appears the patient possibly passed a stone 
out.  The stomach was decompressed and the scope was removed.

 

RECOMMENDATION:

1.  Repeat LFTs and lipase tomorrow.

2.  May proceed with laparoscopic cholecystectomy and cholangiogram.  If the cholangiogram is abnorma
l, the patient may need repeat ERCP.  However, in looking at the liver function, it is coming down to
da.  Bilirubin is down to 3.6, AST down to 66 and ALT down to 215.  It is possible that the patient p
robably passed a stone out.

 

Recommendation is to proceed with laparoscopy cholecystectomy and cholangiogram.  If the cholangiogra
m is abnormal, we will try to repeat ERCP.

## 2018-05-17 NOTE — PRG
DATE OF SERVICE:  05/16/2018

 

This is a 52-year-old Latin-American male hospitalized with acute pancreatitis.  He has a dilation of
 CBD and also common bile duct stone and gallstone.  His lipase is 7000 which has come back close to 
being normal today.  In the ERCP, the CBD could not be cannulated.  The PD was cannulated.  However, 
he was found to have very brisk biliary drainage and his bilirubin came down rapidly. Based on the in
formation, I believe he probably passed a stone down and his LFTs declined rapidly.  I did talk to Dr Rikki Naylor and explained the information.  The patient actually appears comfortable.  No abdomin
al pain, no nausea.  Abdomen is soft and nontender.

 

PLAN:  Regular diet today and hopefully a laparoscopic cholecystectomy with IOC can be done tomorrow.

## 2018-05-17 NOTE — PDOC.FM
- Subjective


Subjective: 





Slim Fagan seen at bedside this morning.  He is doing well.  No acute 

events overnight.  He tolerated his ERPC procedure well yesterday.  He is 

scheduled for a lap juliane with cholangiogram with Dr. Naylor today.  He has no 

complaints and pain is well controlled. 





- Objective


MAR Reviewed: Yes


Vital Signs & Weight: 


 Vital Signs (12 hours)











  Temp Pulse Resp BP Pulse Ox


 


 05/16/18 20:00  98.4 F  64  18  145/72 H  96








 Weight











Weight                         81.335 kg














I&O: 


 











 05/15/18 05/16/18 05/17/18





 06:59 06:59 06:59


 


Intake Total 2200 2100 


 


Balance 2200 2100 











Result Diagrams: 


 05/17/18 03:24





 05/17/18 03:24





Phys Exam





- Physical Examination


Constitutional: NAD


HEENT: moist MMs, sclera anicteric


Neck: no JVD, supple, full ROM


Respiratory: no wheezing, no rales, no rhonchi, clear to auscultation bilateral


Cardiovascular: RRR, no significant murmur


Gastrointestinal: soft, non-tender, no distention


Musculoskeletal: no edema, pulses present


Neurological: non-focal, normal sensation, moves all 4 limbs


Psychiatric: normal affect, A&O x 3


Skin: no rash, normal turgor





Dx/Plan


(1) Gallstone pancreatitis


Code(s): K85.10 - BILIARY ACUTE PANCREATITIS WITHOUT NECROSIS OR INFECTION   

Status: Acute   





- Plan


Plan: 





1. Gallstone pancreatitis


- Lipase 7593 on admission, elevated Alk Phos, AST/ALT


- Lipase down to 145 today


- GI consulted, appreciate recs


- General surgery consulted, appreciate recs


- Continue pain control


- NPO


- IVF


- ERCP 5/16


- Lap Juliane with cholangiogram today





2. Hypertension


- Started home meds 


- Continue to monitor 





3. Elevated Liver Enzymes


- Past history, Hepatitis panel negative, will need vaccines prior to discharge


- Acute process causing elevation at this time.

## 2018-05-18 NOTE — OP
DATE OF PROCEDURE:  05/17/2018

 

PREOPERATIVE DIAGNOSIS:  Gallstone pancreatitis.

 

POSTOPERATIVE DIAGNOSIS:  Gallstone pancreatitis.

 

PROCEDURE:  Laparoscopic cholecystectomy, intraoperative cholangiogram.

 

SURGEON:  Héctor Naylor M.D.

 

ANESTHESIA:  General.

 

ESTIMATED BLOOD LOSS:  Minimal.

 

COMPLICATIONS:  None.

 

SPECIMEN:  Gallbladder.

 

FINDINGS:  Cholangiogram shows a dilated common and hepatic bile duct, although there is good contras
t flow into the duodenum without obvious ductal obstruction.

 

TECHNIQUE:  The patient was taken to the operating room and placed supine on the table.  After genera
l anesthetic was obtained, the abdomen were shaved, prepped, and draped in a sterile fashion.  Curved
 incision made below the umbilicus.  Cautery was used to dissect down to and score the fascia.  Abdom
inal cavity entered bluntly using a Cassie clamp.  Holding stitch of PDS was placed on each side of th
e fascia.  Henson trocar was placed.  High-flow pneumoperitoneum was obtained.  An upper midline 5-mm
 port and two right upper quadrant 5-mm ports were placed under direct visualization.  Gallbladder wa
s retracted from the gallbladder fossa.  Peritoneum was taken anteriorly and posteriorly.  Critical v
iew triangle was seen showing only the cystic duct and cystic artery branching from medial to lateral
, no other branching structures.  Clip was placed high on the cystic duct and a small ductotomy was m
abel just proximal to that.  A cholangiogram was brought in through a separate stab incision, placed i
n the cystic duct and a cholangiogram was performed which shows contrast flow into the duodenum.  The
 common bile duct was dilated as are the intrahepatic bile ducts, but there was no obvious distal obs
truction.  Cholangiocatheter was removed and 2 clips were placed proximally on the cystic duct, was c
ut using laparoscopic scissors.  Cystic artery was taken using two clips proximally and one clip dist
ally, and cut using laparoscopic scissors.  Cautery was then used to dissect the gallbladder out of t
he gallbladder fossa.  Gallbladder was placed in an Endo catch bag and brought out through the Henson
.  There was no bleeding or bile in the liver bed.  The right upper quadrant was irrigated using ster
ile solution until returns were clear.  All port sites were infiltrated using local anesthetic.  All 
ports were removed from camera visualization.  Pneumoperitoneum was let down.  PDS was used to close 
the fascial defect below the umbilicus.  All incisions were irrigated and closed using 4-0 Monocryl a
nd Dermabond.  The patient was en route to recovery in stable condition.  All sponge counts, needle c
ounts, lap counts are correct.

## 2018-05-18 NOTE — DIS-2
DATE OF ADMISSION:  2018

 

DATE OF DISCHARGE:  2018

 

RESIDENT:  Domingo Ragland MD

 

ADMITTING ATTENDING:  Joao Murphy MD

 

DISCHARGE ATTENDING:  Lamberto Molina MD

 

CONSULTATIONS:

1.  Gastroenterology, Dr. Wilson on 2018.

2.  General surgery, Dr. Naylor on 2018.

 

PROCEDURES:

1.  CT of abdomen and pelvis on 2018.  Impression:  Intra and extrahepatic biliary ductal dilat
ation with common bile duct measuring 1.2 cm, dilation of the common bile duct to the level of the am
jared.  Ampullary lesion could not be excluded.  Increased density material in the gallbladder lumen 
which could be related to sludge or gallbladder calculi.  Gallbladder is also lobulated and contracte
d.  Inflammatory stranding of the left pericolic gutter adjacent to the tail of the pancreas, colonic
 diverticulosis, and fluid filled mildly prominent loops of small bowel.  No findings to suggest obst
ruction.

2.  Abdominal ultrasound on 2018.  Impression:  Coarse and heterogeneous appearance of the live
r may be sequelae of cirrhosis or steatosis.  Apparent sludge and stones in the gallbladder.  Common 
bile duct measures up to 1.2 cm, distal common bile duct measures up to 1.5 cm.

3.  Chest x-ray on 2018.  Impression:  Mild cardiomegaly; atelectasis, left lung base.

4.  ERCP on 2018.  Postop diagnosis, unable to cannulate the common bile duct, normal pancreati
c duct.  Recommended to proceed with laparoscopic cholecystectomy and cholangiogram.

5.  Operative cholangiogram on 2018.  Impression:  Abnormal dilatation of the common bile duct 
and common hepatic duct with significant caliber change in the distal aspect of the common bile duct 
at the level of the sphincter.

6.  Laparoscopic cholecystectomy and intraoperative cholangiogram on 2018.

 

PRIMARY DIAGNOSIS:  Gallstone pancreatitis.

 

SECONDARY DIAGNOSES:

1.  Hypertension.

2.  Elevated liver enzymes.

 

DISCHARGE MEDICATIONS:  Resume home medications includin.  Clonidine 0.1 mg p.o. at bedtime.

2.  Zofran 4 mg p.o. q.6 hours p.r.n.

3.  Norco 10/325 1-2 tablets p.o. p.r.n.

 

NEW HOME MEDICATIONS:  Include;

1.  Norco 10/325 1-2 tablets p.o. q.4 hours p.r.n.

2.  Ibuprofen 800 mg p.o. q.8 hours p.r.n.

 

HISTORY OF PRESENT ILLNESS AND HOSPITAL COURSE:  Slim Fagan is a 52-year-old male with a past med
ical history of hypertension, who presented to the ED with a 1-day history of nausea, vomiting, and a
bdominal pain.  States that the pain began the previous night and has progressively gotten worse.  He
 states that he has had no recent illness.  He had 1 alcoholic beverage the day before.  Denies any e
xcessive fatty foods and actually states that he has been on a diet.  He denies any shortness of talia
th, chest pain, fevers, chills.  states that this has never happened before.  He has never had any pa
in like this in the past.  In the ED, morphine and Zofran to relieve the pain.  On admission, vital s
igns are 155/98, heart rate 71, respiratory rate 16, temperature was afebrile, pulse ox 97% on room a
ir.  Significant labs were total bilirubin of 5.4, AST was 131, ALT was 368, alkaline phosphatase was
 278, lipase was 7593.  White blood cell count 7.2, hemoglobin 15.1, platelets 218,000.  EKG showed n
ormal sinus rhythm.  The patient was admitted for gallstone pancreatitis.  IV fluids were started.  P
atient was made n.p.o.  General Surgery and GI were consulted.  Lab studies after first night of admi
ssion and fluids resulted in drop of lipase from 7500 to 3400 and the next day, dropped all the way d
own to 380 and then 145 on date of discharge, 2018.  Bilirubin, AST, ALT, and alkaline phosphat
ase all trended down every day throughout admission.  Final levels were total bilirubin of 1.8, AST o
f 70, ALT of 186, alkaline phosphatase of 187.  Patient's pain was well controlled throughout his adm
ission.  ERCP was done on 2018.  Common bile duct was unable to be cannulated.  GI thought that
 it was likely that stone had already passed and on the following day, General Surgery, Dr. Naylor, 
decided to take patient for elective laparoscopic cholecystectomy with intraoperative cholangiogram. 
 Cholangiogram showed no stones and the patient tolerated the procedure well.  The patient was cleare
d for discharge by General Surgery and GI as well as the medical team on 2018 with instructions
 to follow up with GI and General Surgery and primary care provider on an outpatient basis.

 

DISPOSITION:  Stable.  The patient should do well if he continues routine postoperative instructions 
and follows up with a General Surgery and Gastroenterology within 1-2 weeks.  Patient should also est
ablish care with primary care provider.

 

DISCHARGE INSTRUCTIONS:

1.  Location:  Home.

2.  Diet:  Heart healthy.

3.  Activity:  As tolerated.

4.  Followup:  Follow up with Dr. Naylor with General Surgery and Dr. Wilson within 2 weeks.

## 2018-05-19 NOTE — EKG
Test Reason : 

Blood Pressure : ***/*** mmHG

Vent. Rate : 062 BPM     Atrial Rate : 062 BPM

   P-R Int : 136 ms          QRS Dur : 108 ms

    QT Int : 398 ms       P-R-T Axes : 005 015 041 degrees

   QTc Int : 403 ms

 

Normal sinus rhythm

Normal ECG

 

Confirmed by VIKAS VALDES MD ([id number]),  TORIE CHRISTIANSON ([id number]) on 5/19/2018 4:44:24 PM

 

Referred By:             Confirmed By:VIKAS VALDES MD

## 2018-06-28 ENCOUNTER — HOSPITAL ENCOUNTER (OUTPATIENT)
Dept: HOSPITAL 92 - LABBT | Age: 53
Discharge: HOME | End: 2018-06-28
Attending: ORTHOPAEDIC SURGERY
Payer: COMMERCIAL

## 2018-06-28 DIAGNOSIS — D48.7: ICD-10-CM

## 2018-06-28 DIAGNOSIS — Z01.812: Primary | ICD-10-CM

## 2018-06-28 LAB
BASOPHILS # BLD AUTO: 0.1 THOU/UL (ref 0–0.2)
BASOPHILS NFR BLD AUTO: 1.3 % (ref 0–1)
EOSINOPHIL # BLD AUTO: 0.1 THOU/UL (ref 0–0.7)
EOSINOPHIL NFR BLD AUTO: 1.3 % (ref 0–10)
HGB BLD-MCNC: 15.5 G/DL (ref 14–18)
LYMPHOCYTES # BLD: 3.1 THOU/UL (ref 1.2–3.4)
LYMPHOCYTES NFR BLD AUTO: 42.6 % (ref 21–51)
MCH RBC QN AUTO: 31.1 PG (ref 27–31)
MCV RBC AUTO: 88.9 FL (ref 78–98)
MONOCYTES # BLD AUTO: 0.5 THOU/UL (ref 0.11–0.59)
MONOCYTES NFR BLD AUTO: 6.3 % (ref 0–10)
NEUTROPHILS # BLD AUTO: 3.5 THOU/UL (ref 1.4–6.5)
NEUTROPHILS NFR BLD AUTO: 48.7 % (ref 42–75)
PLATELET # BLD AUTO: 236 THOU/UL (ref 130–400)
RBC # BLD AUTO: 5 MILL/UL (ref 4.7–6.1)
WBC # BLD AUTO: 7.3 THOU/UL (ref 4.8–10.8)

## 2018-06-28 PROCEDURE — 85025 COMPLETE CBC W/AUTO DIFF WBC: CPT

## 2018-06-29 ENCOUNTER — HOSPITAL ENCOUNTER (OUTPATIENT)
Dept: HOSPITAL 92 - SDC | Age: 53
Discharge: HOME | End: 2018-06-29
Attending: ORTHOPAEDIC SURGERY
Payer: COMMERCIAL

## 2018-06-29 VITALS — BODY MASS INDEX: 33.1 KG/M2

## 2018-06-29 DIAGNOSIS — Z79.899: ICD-10-CM

## 2018-06-29 DIAGNOSIS — I10: ICD-10-CM

## 2018-06-29 DIAGNOSIS — D36.12: Primary | ICD-10-CM

## 2018-06-29 PROCEDURE — A4216 STERILE WATER/SALINE, 10 ML: HCPCS

## 2018-06-29 PROCEDURE — S0020 INJECTION, BUPIVICAINE HYDRO: HCPCS

## 2018-06-29 PROCEDURE — 0JBK0ZZ EXCISION OF LEFT HAND SUBCUTANEOUS TISSUE AND FASCIA, OPEN APPROACH: ICD-10-PCS | Performed by: ORTHOPAEDIC SURGERY

## 2018-06-29 PROCEDURE — 96372 THER/PROPH/DIAG INJ SC/IM: CPT

## 2018-06-29 PROCEDURE — 88307 TISSUE EXAM BY PATHOLOGIST: CPT

## 2018-06-29 PROCEDURE — 96374 THER/PROPH/DIAG INJ IV PUSH: CPT

## 2018-06-29 PROCEDURE — 88305 TISSUE EXAM BY PATHOLOGIST: CPT

## 2018-06-29 NOTE — OP
DATE OF PROCEDURE:  06/29/2018

 

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
x.

 

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
.

 

PROCEDURE:  Excisional biopsy of benign neoplasm, left ring finger.

 

SURGEON:  Dr. Dylan Stevens

 

FINDINGS:  Bilobed 2.0 x 0.5 cm kidney bean shaped mass located in the mid dorsum of the proximal pha
lanx of the same left ring finger.

 

SPECIMEN:  Yes, the above specimen sent to lab.

 

TOURNIQUET TIME:  5 minutes.

 

BLOOD LOSS:  Less than 10 mL.  

 

ANESTHESIA:  General LMA technique by [employer] augmented by 10 mL preincisional metacarpop
halangeal joint block level with 0.5% Marcaine, no epinephrine.

 

DESCRIPTION OF PROCEDURE:   After successful general LMA technique, the limb was prepped and draped. 
 Because the patient had a Tinel's over the area we were concerned about the cutaneous nerve, so we e
xsanguinated the limb after giving him Marcaine, inflated tourniquet to 250 mmHg pressure.  Outlined 
a zigzag incision over the mass.  The incision would be angled away from the center of the mass case 
in case it was a nerve problem.

 

We then carried the incision through skin, subcutaneous tissue, fat and then realized that the mass a
ppeared first to be cystic totally, but then it proved to be a lobulated kidney bean shaped 2 cm long
 x 5 mm wide fleshy type mass that did not communicate with the tendon sheath, but communicated with 
underlying cutaneous nerve.  We then performed a neuroplasty to remove the cutaneous nerve from the m
ass.  We placed the mass on the back table and sent it to the lab.  We released and the tourniquet, o
btained hemostasis, placed 3 mL of Celestone in the wound and held pressure for 5 minutes.  Then, the
 wound bleeding was completely gone and we closed the wound with interrupted 4-0 nylon in a simple pa
ttern.  Bulky dressing was applied and the patient left the operating room without evidence of anesth
etic complications.

## 2018-09-17 ENCOUNTER — APPOINTMENT (RX ONLY)
Dept: URBAN - METROPOLITAN AREA CLINIC 91 | Facility: CLINIC | Age: 53
Setting detail: DERMATOLOGY
End: 2018-09-17

## 2018-09-17 DIAGNOSIS — L40.0 PSORIASIS VULGARIS: ICD-10-CM

## 2018-09-17 PROCEDURE — ? ADDITIONAL NOTES

## 2018-09-17 PROCEDURE — ? COUNSELING

## 2018-09-17 PROCEDURE — 99201: CPT

## 2018-09-17 PROCEDURE — ? PRESCRIPTION

## 2018-09-17 RX ORDER — CLOBETASOL PROPIONATE 0.5 MG/G
CREAM TOPICAL
Qty: 1 | Refills: 1 | Status: ERX | COMMUNITY
Start: 2018-09-17

## 2018-09-17 RX ADMIN — CLOBETASOL PROPIONATE: 0.5 CREAM TOPICAL at 00:00

## 2018-09-17 ASSESSMENT — LOCATION SIMPLE DESCRIPTION DERM
LOCATION SIMPLE: RIGHT FOREARM
LOCATION SIMPLE: RIGHT LOWER BACK
LOCATION SIMPLE: LEFT FOREARM
LOCATION SIMPLE: LEFT SCALP

## 2018-09-17 ASSESSMENT — LOCATION ZONE DERM
LOCATION ZONE: ARM
LOCATION ZONE: SCALP
LOCATION ZONE: TRUNK

## 2018-09-17 ASSESSMENT — LOCATION DETAILED DESCRIPTION DERM
LOCATION DETAILED: LEFT MEDIAL FRONTAL SCALP
LOCATION DETAILED: RIGHT PROXIMAL DORSAL FOREARM
LOCATION DETAILED: RIGHT SUPERIOR MEDIAL MIDBACK
LOCATION DETAILED: LEFT DISTAL DORSAL FOREARM

## 2018-09-17 ASSESSMENT — PGA PSORIASIS: PGA PSORIASIS: MODERATE (MODERATE PLAQUE ELEVATION, MODERATE ERYTHEMA, COARSE SCALE PREDOMINATES)

## 2018-11-15 ENCOUNTER — APPOINTMENT (RX ONLY)
Dept: URBAN - METROPOLITAN AREA CLINIC 91 | Facility: CLINIC | Age: 53
Setting detail: DERMATOLOGY
End: 2018-11-15

## 2018-11-15 DIAGNOSIS — L40.0 PSORIASIS VULGARIS: ICD-10-CM

## 2018-11-15 PROCEDURE — 99211 OFF/OP EST MAY X REQ PHY/QHP: CPT

## 2018-11-15 PROCEDURE — ? PRESCRIPTION

## 2018-11-15 PROCEDURE — ? COUNSELING

## 2018-11-15 PROCEDURE — ? ADDITIONAL NOTES

## 2018-11-15 RX ORDER — CLOBETASOL PROPIONATE 0.5 MG/G
CREAM TOPICAL
Qty: 1 | Refills: 1 | Status: ERX

## 2018-11-15 ASSESSMENT — LOCATION DETAILED DESCRIPTION DERM
LOCATION DETAILED: LEFT DISTAL POSTERIOR UPPER ARM
LOCATION DETAILED: INFERIOR THORACIC SPINE
LOCATION DETAILED: RIGHT DISTAL POSTERIOR UPPER ARM
LOCATION DETAILED: MEDIAL FRONTAL SCALP

## 2018-11-15 ASSESSMENT — LOCATION ZONE DERM
LOCATION ZONE: TRUNK
LOCATION ZONE: ARM
LOCATION ZONE: SCALP

## 2018-11-15 ASSESSMENT — LOCATION SIMPLE DESCRIPTION DERM
LOCATION SIMPLE: UPPER BACK
LOCATION SIMPLE: FRONTAL SCALP
LOCATION SIMPLE: LEFT UPPER ARM
LOCATION SIMPLE: RIGHT UPPER ARM

## 2018-11-15 ASSESSMENT — PGA PSORIASIS: PGA PSORIASIS: MODERATE (MODERATE PLAQUE ELEVATION, MODERATE ERYTHEMA, COARSE SCALE PREDOMINATES)

## 2019-03-05 ENCOUNTER — APPOINTMENT (RX ONLY)
Dept: URBAN - METROPOLITAN AREA CLINIC 91 | Facility: CLINIC | Age: 54
Setting detail: DERMATOLOGY
End: 2019-03-05

## 2019-03-05 DIAGNOSIS — L40.0 PSORIASIS VULGARIS: ICD-10-CM

## 2019-03-05 PROCEDURE — ? COUNSELING

## 2019-03-05 PROCEDURE — 99211 OFF/OP EST MAY X REQ PHY/QHP: CPT

## 2019-03-05 PROCEDURE — ? ADDITIONAL NOTES

## 2019-03-05 ASSESSMENT — PGA PSORIASIS: PGA PSORIASIS: CLEAR (NO ELEVATION, SCALE, OR ERYTHEMA, EXCEPT FOR RESIDUAL DISCOLORATION)

## 2019-03-05 NOTE — PROCEDURE: ADDITIONAL NOTES
Additional Notes: Psoriasis under great control with Cosentyx. Will send in refills as soon as we see blood work if blood work allows.
Detail Level: Simple

## 2019-07-08 ENCOUNTER — APPOINTMENT (RX ONLY)
Dept: URBAN - METROPOLITAN AREA CLINIC 91 | Facility: CLINIC | Age: 54
Setting detail: DERMATOLOGY
End: 2019-07-08

## 2019-07-08 DIAGNOSIS — L40.0 PSORIASIS VULGARIS: ICD-10-CM

## 2019-07-08 PROCEDURE — ? COUNSELING

## 2019-07-08 PROCEDURE — ? ADDITIONAL NOTES

## 2019-07-08 ASSESSMENT — LOCATION SIMPLE DESCRIPTION DERM
LOCATION SIMPLE: RIGHT ELBOW
LOCATION SIMPLE: SCALP

## 2019-07-08 ASSESSMENT — LOCATION DETAILED DESCRIPTION DERM
LOCATION DETAILED: LEFT CENTRAL FRONTAL SCALP
LOCATION DETAILED: RIGHT ELBOW

## 2019-07-08 ASSESSMENT — LOCATION ZONE DERM
LOCATION ZONE: SCALP
LOCATION ZONE: ARM

## 2019-07-08 ASSESSMENT — PGA PSORIASIS: PGA PSORIASIS: MINIMAL (MINIMAL PLAQUE ELEVATION, FAINT ERYTHEMA, OCCASIONAL FINE SCALE)

## 2019-07-08 NOTE — PROCEDURE: ADDITIONAL NOTES
Detail Level: Simple
Additional Notes: Pt is very happy with results, psoriasis under great control. Waiting on blood work to send Rx.

## 2019-10-16 ENCOUNTER — APPOINTMENT (RX ONLY)
Dept: URBAN - METROPOLITAN AREA CLINIC 91 | Facility: CLINIC | Age: 54
Setting detail: DERMATOLOGY
End: 2019-10-16

## 2019-10-16 DIAGNOSIS — Z71.89 OTHER SPECIFIED COUNSELING: ICD-10-CM

## 2019-10-16 DIAGNOSIS — L40.0 PSORIASIS VULGARIS: ICD-10-CM | Status: IMPROVED

## 2019-10-16 PROCEDURE — 99214 OFFICE O/P EST MOD 30 MIN: CPT

## 2019-10-16 PROCEDURE — ? TREATMENT REGIMEN

## 2019-10-16 PROCEDURE — ? COUNSELING

## 2019-10-16 ASSESSMENT — PGA PSORIASIS: PGA PSORIASIS: CLEAR (NO ELEVATION, SCALE, OR ERYTHEMA, EXCEPT FOR RESIDUAL DISCOLORATION)

## 2019-10-16 ASSESSMENT — BSA PSORIASIS: % BODY COVERED IN PSORIASIS: 0

## 2020-01-16 ENCOUNTER — APPOINTMENT (RX ONLY)
Dept: URBAN - METROPOLITAN AREA CLINIC 91 | Facility: CLINIC | Age: 55
Setting detail: DERMATOLOGY
End: 2020-01-16

## 2020-01-16 DIAGNOSIS — L40.0 PSORIASIS VULGARIS: ICD-10-CM | Status: WELL CONTROLLED

## 2020-01-16 DIAGNOSIS — Z71.89 OTHER SPECIFIED COUNSELING: ICD-10-CM

## 2020-01-16 PROCEDURE — ? TREATMENT REGIMEN

## 2020-01-16 PROCEDURE — 99213 OFFICE O/P EST LOW 20 MIN: CPT

## 2020-01-16 PROCEDURE — ? COUNSELING

## 2020-01-16 ASSESSMENT — PGA PSORIASIS: PGA PSORIASIS: CLEAR (NO ELEVATION, SCALE, OR ERYTHEMA, EXCEPT FOR RESIDUAL DISCOLORATION)

## 2020-01-21 ENCOUNTER — RX ONLY (OUTPATIENT)
Age: 55
Setting detail: RX ONLY
End: 2020-01-21

## 2020-07-13 ENCOUNTER — APPOINTMENT (RX ONLY)
Dept: URBAN - METROPOLITAN AREA CLINIC 91 | Facility: CLINIC | Age: 55
Setting detail: DERMATOLOGY
End: 2020-07-13

## 2020-07-13 DIAGNOSIS — L40.0 PSORIASIS VULGARIS: ICD-10-CM | Status: WELL CONTROLLED

## 2020-07-13 PROCEDURE — ? ORDER TESTS

## 2020-07-13 PROCEDURE — ? COUNSELING

## 2020-07-13 PROCEDURE — 99214 OFFICE O/P EST MOD 30 MIN: CPT

## 2020-07-13 PROCEDURE — ? PRESCRIPTION

## 2020-07-13 PROCEDURE — ? TREATMENT REGIMEN

## 2020-07-13 RX ORDER — CLOBETASOL PROPIONATE 0.5 MG/G
CREAM TOPICAL
Qty: 1 | Refills: 2 | Status: ERX

## 2020-07-13 NOTE — PROCEDURE: TREATMENT REGIMEN
Detail Level: Zone
Continue Regimen: Cosentyx
Action 3: Continue
Other Instructions: Will send in refill once we receive blood work. No symptoms consistent with dactylitis enthesitis.

## 2020-08-12 ENCOUNTER — RX ONLY (OUTPATIENT)
Age: 55
Setting detail: RX ONLY
End: 2020-08-12

## 2020-08-12 RX ORDER — SECUKINUMAB 150 MG/ML
INJECTION SUBCUTANEOUS
Qty: 1 | Refills: 3 | Status: ERX

## 2020-10-13 ENCOUNTER — APPOINTMENT (RX ONLY)
Dept: URBAN - METROPOLITAN AREA CLINIC 91 | Facility: CLINIC | Age: 55
Setting detail: DERMATOLOGY
End: 2020-10-13

## 2020-10-13 DIAGNOSIS — L40.0 PSORIASIS VULGARIS: ICD-10-CM | Status: WELL CONTROLLED

## 2020-10-13 DIAGNOSIS — Z71.89 OTHER SPECIFIED COUNSELING: ICD-10-CM

## 2020-10-13 PROCEDURE — ? ADDITIONAL NOTES

## 2020-10-13 PROCEDURE — ? COUNSELING

## 2020-10-13 PROCEDURE — 99214 OFFICE O/P EST MOD 30 MIN: CPT

## 2020-10-13 PROCEDURE — ? TREATMENT REGIMEN

## 2021-01-07 ENCOUNTER — RX ONLY (OUTPATIENT)
Age: 56
Setting detail: RX ONLY
End: 2021-01-07

## 2021-01-07 RX ORDER — CLOBETASOL PROPIONATE 0.5 MG/G
CREAM TOPICAL
Qty: 1 | Refills: 5 | Status: ERX